# Patient Record
Sex: FEMALE | Race: WHITE | NOT HISPANIC OR LATINO | URBAN - NONMETROPOLITAN AREA
[De-identification: names, ages, dates, MRNs, and addresses within clinical notes are randomized per-mention and may not be internally consistent; named-entity substitution may affect disease eponyms.]

---

## 2019-09-13 ENCOUNTER — IMPORTED ENCOUNTER (OUTPATIENT)
Dept: URBAN - NONMETROPOLITAN AREA CLINIC 1 | Facility: CLINIC | Age: 74
End: 2019-09-13

## 2019-09-13 PROBLEM — H52.4: Noted: 2019-09-13

## 2019-09-13 PROBLEM — Z96.1: Noted: 2019-09-13

## 2019-09-13 PROBLEM — H52.223: Noted: 2019-09-13

## 2019-09-13 PROBLEM — H35.373: Noted: 2019-09-13

## 2019-09-13 PROBLEM — H52.03: Noted: 2019-09-13

## 2019-09-13 PROCEDURE — 92014 COMPRE OPH EXAM EST PT 1/>: CPT

## 2019-09-13 PROCEDURE — 92015 DETERMINE REFRACTIVE STATE: CPT

## 2019-09-13 NOTE — PATIENT DISCUSSION
*Epiretinal membrane OU.-Discussed findings of exam in detail with the patient.-Discussed the signs of worsening of the disease. .Astigmatism-Discussed diagnosis with patient. Hyperopia-Discussed diagnosis with patient. Presbyopia-Discussed diagnosis with patient. s/p rk ou (4 incision)pseudophakia oumonitor

## 2021-04-02 ENCOUNTER — IMPORTED ENCOUNTER (OUTPATIENT)
Dept: URBAN - NONMETROPOLITAN AREA CLINIC 1 | Facility: CLINIC | Age: 76
End: 2021-04-02

## 2021-04-02 PROCEDURE — 92014 COMPRE OPH EXAM EST PT 1/>: CPT

## 2021-04-02 PROCEDURE — 92015 DETERMINE REFRACTIVE STATE: CPT

## 2021-04-02 NOTE — PATIENT DISCUSSION
*Epiretinal membrane OU.-Discussed findings of exam in detail with the patient.-Discussed the signs of worsening of the disease. .Astigmatism-Discussed diagnosis with patient. Hyperopia-Discussed diagnosis with patient. Presbyopia-Discussed diagnosis with patient. s/p rk ou (4 incision)pseudophakia oumonitor FOR PCOPT DESIRES CONSULT WITH DR.S Ann Marie Gallegos FOR CONSULT OPTION TO CORRECT ASTIGMATISM

## 2021-04-30 ENCOUNTER — IMPORTED ENCOUNTER (OUTPATIENT)
Dept: URBAN - NONMETROPOLITAN AREA CLINIC 1 | Facility: CLINIC | Age: 76
End: 2021-04-30

## 2021-04-30 PROCEDURE — 92012 INTRM OPH EXAM EST PATIENT: CPT

## 2021-04-30 NOTE — PATIENT DISCUSSION
[facility] Enhancement ConsultDiscussed patients ocular sx hx w/ RK and AK and CE OU and s/p YAG PC OU. Explained to patient there is no correction I can do considering all the types of surgery she has had that has altered her cornea and astigmatism. Pt now has some mild irregular surgical astigmatism. Patient understands. Recommend patient get updated RX Not treatment needed COntinue to monitor NOTES BELOW FORM PREVIOUS *Epiretinal membrane OU.-Discussed findings of exam in detail with the patient.-Discussed the signs of worsening of the disease. .Astigmatism-Discussed diagnosis with patient. Hyperopia-Discussed diagnosis with patient. Presbyopia-Discussed diagnosis with patient. s/p rk ou (4 incision)pseudophakia oumonitor FOR PCOPT DESIRES CONSULT WITH DR.S Treva Garcia FOR CONSULT OPTION TO CORRECT ASTIGMATISM

## 2022-04-09 ASSESSMENT — VISUAL ACUITY
OD_SC: 20/30+2
OD_CC: J1
OD_SC: 20/30
OS_CC: J1
OS_CC: J1
OS_SC: 20/20
OD_CC: J1
OS_SC: 20/20
OD_SC: 20/70
OS_SC: 20/25+2

## 2022-04-09 ASSESSMENT — TONOMETRY
OS_IOP_MMHG: 14
OS_IOP_MMHG: 13
OS_IOP_MMHG: 13
OD_IOP_MMHG: 14
OD_IOP_MMHG: 13
OD_IOP_MMHG: 14